# Patient Record
Sex: FEMALE
[De-identification: names, ages, dates, MRNs, and addresses within clinical notes are randomized per-mention and may not be internally consistent; named-entity substitution may affect disease eponyms.]

---

## 2018-11-21 ENCOUNTER — HOSPITAL ENCOUNTER (OUTPATIENT)
Dept: HOSPITAL 31 - C.SDS | Age: 38
Discharge: HOME | End: 2018-11-21
Attending: STUDENT IN AN ORGANIZED HEALTH CARE EDUCATION/TRAINING PROGRAM
Payer: COMMERCIAL

## 2018-11-21 VITALS
SYSTOLIC BLOOD PRESSURE: 105 MMHG | OXYGEN SATURATION: 99 % | HEART RATE: 83 BPM | DIASTOLIC BLOOD PRESSURE: 53 MMHG | TEMPERATURE: 97.7 F

## 2018-11-21 VITALS — RESPIRATION RATE: 18 BRPM

## 2018-11-21 VITALS — BODY MASS INDEX: 32.8 KG/M2

## 2018-11-21 DIAGNOSIS — M24.151: ICD-10-CM

## 2018-11-21 DIAGNOSIS — M67.861: ICD-10-CM

## 2018-11-21 DIAGNOSIS — M65.851: ICD-10-CM

## 2018-11-21 DIAGNOSIS — S73.191S: Primary | ICD-10-CM

## 2018-11-21 DIAGNOSIS — M71.58: ICD-10-CM

## 2018-11-21 DIAGNOSIS — M23.203: ICD-10-CM

## 2018-11-21 PROCEDURE — 20610 DRAIN/INJ JOINT/BURSA W/O US: CPT

## 2018-11-21 PROCEDURE — 77002 NEEDLE LOCALIZATION BY XRAY: CPT

## 2018-11-21 NOTE — RAD
Date of service: 



11/21/2018



PROCEDURE:  Intraoperative Fluoroscopy. 



HISTORY:

Right HIP LABRAL TEAR, right GREAT TROCHANTER BURSITIS



FINDINGS:

Fluoroscopic assistance was provided for right hip and right greater 

trochanteric injection.  Please refer to the operative report from 

LUECRO Rhodes, , MD DENIS.

## 2018-11-21 NOTE — PCM.SURG1
Surgeon's Initial Post Op Note





- Surgeon's Notes


Surgeon: Joelle Lomax MD


Assistant: None


Type of Anesthesia: IV Sedation, Local


Pre-Operative Diagnosis: A. Right hip:  #1 labral tear.  #2 synovitis.  #3 

Greater Trochanteric Bursitis.  #4 Abductor Tendonopathy.  .  B. Right knee:  #1

synovitis.  #2 hypertrophic symptomatic medial plica bands.  #3 hypertrophic, 

inflamed infrapatellar fat pad.  #4 medial meniscal tear (consistently on physi

maricruz exam, not seen on MRI)


Operative Findings: A. Right hip:  #1 labral tear.  #2 synovitis.  #3 Greater 

Trochanteric Bursitis.  #4 Abductor Tendonopathy.  .  B. Right knee:  #1 

synovitis.  #2 hypertrophic symptomatic medial plica bands.  #3 hypertrophic, 

inflamed infrapatellar fat pad.  #4 medial meniscal tear (consistently on 

physical exam, not seen on MRI)


Post-Operative Diagnosis: A. Right hip:  #1 labral tear.  #2 synovitis.  #3 

Greater Trochanteric Bursitis.  #4 Abductor Tendonopathy.  .  B. Right knee:  #1

synovitis.  #2 hypertrophic symptomatic medial plica bands.  #3 hypertrophic, 

inflamed infrapatellar fat pad.  #4 medial meniscal tear (consistently on 

physical exam, not seen on MRI)


Operation Performed: A.  Right hip :  #1 Flouroscoic guided intra-articular 

injection.  #2 Greater trochanteric bursitis injection.  .  B. Right knee:  #1 

Lateral compartment intra-articular injection.  #2 Medial compartment intra-

articular injection


Specimen/Specimens Removed: Specimen= none.  Complications= none.  Implants= 

none.  Injections= Each injection consisted of 5cc total volume= 2cc 2% 

Lidocaine w/o epi preservative free, 2cc 0.5% Marcaine w/o epi presevative free,

1cc Depomedrol preservatice free


Estimated Blood Loss: EBL {In ML}: 1


Blood Products Given: N/A


Drains Used: No Drains


Post-Op Condition: Good


Date of Surgery/Procedure: 11/21/18


Time of Surgery/Procedure: 09:00

## 2018-12-26 NOTE — OP
PROCEDURE DATE:  11/21/2018



PREOPERATIVE DIAGNOSES:

PART A:  Right hip:

1.  Labral tear.

2.  Synovitis.

3.  Greater trochanteric bursitis.

4.  Abductor tendinopathy.



PART B:  Right knee:

1.  Synovitis.

2.  Hypertrophic symptomatic medial plica bands.

3.  Hypertrophic inflamed infrapatellar fat pad.

4.  Medial meniscal tear.



POSTOPERATIVE DIAGNOSES:

PART A:  Right hip:

1.  Labral tear.

2.  Synovitis.

3.  Greater trochanteric bursitis.

4.  Abductor tendinopathy.



PAR B:  Right knee:

1.  Synovitis.

2.  Hypertrophic symptomatic medial plica bands.

3.  Hypertrophic inflamed infrapatellar fat pad.

4.  Medial meniscal tear.



PROCEDURE:

Part A:  Right hip

1.  Fluoroscopic guided intraarticular cortisone mixture injection.

2.  Fluoroscopic guided greater trochanteric bursa/abductor tendon,

cortisone mixture injection.



PART B:

1.  Right knee lateral compartment intraarticular cortisone mixture

injection under sedation.

2.  Medial compartment intraarticular injection under sedation.



SURGEON:  Joelle Lomax MD.



ASSISTANT:  None.



ANESTHESIA:  IV sedation and local anesthetic placed at right hip by

surgeon.



SPECIMENS:  None.



COMPLICATIONS:  None.



IMPLANTS:  None.



ESTIMATED BLOOD LOSS:  1 mL.



DRAINS:  None.



DISPOSITION:  The patient was awakened from sedation and transferred to

PACU in stable condition.



INJECTION CONTENTS:  Each injection administered, four in total, two for

right hip and two for right knee consisted of 5 mL total volume = 2 mL 2%

lidocaine without epinephrine, preservative free, 2 mL 0.5% Marcaine

without epinephrine preservative free, 1 mL Depo-Medrol 40 mg preservative

free.



INDICATIONS FOR PROCEDURE:  The patient is a 38-year-old female with a past

medical history significant for hypothyroidism and anxiety who presents to

the office for the first time under my care on 10/25/2018 with right hip

and right knee pain, progressively worsening over the past three months of

insidious onset.  The pain became significant negative impact on her

quality of life with difficulty with ambulation and going up and down

stairs.  MRI of the right hip done [facility] was read as:

1.  No evidence of fracture AVN.

2.  Tear of the anterior superior right acetabular labrum.

3.  Mild greater trochanteric bursitis.

4.  No signal abnormalities are seen within the sciatic nerve.



Right knee MRI done at [facility] on 11/06/2018

was read as:

1.  Mild soft tissue edema, small right knee joint effusion with a small

medial and infrapatellar plica.  No fracture or contusion pattern.

2.  Mild patellar malalignment.  No gross chondral defect.

3.  No meniscal or ligament tear.



On my review of the MRI, I felt that there was mild peripheral signal

change in the medial meniscus.



Physical examination of the right hip was consistent with a labral tear

with pain at high flexion and internal rotation and pain in the groin.  She

also has tenderness to palpation at the greater trochanteric bursa and pain

with abduction at the abductor insertion at the greater trochanter.



Right knee examination was consistent with global boggy synovitis and

medial plica bands as well as hypertrophic inflamed fat pad and consistent

medial Isabel and signs of medial meniscus tear clinically despite the

MRI read.  We began to discuss the benefits of maximizing conservative

treatment with multiple cortisone mixture injections.  The cortisone

mixture injections to the right hip would prove to be diagnostic as well as

potentially therapeutic.  If her pain is  significantly improved and does

not return, then she will not be indicated for arthroscopic hip surgery. 

If the injection does nothing to her groin pain, then again she will not be

indicated for arthroscopic hip surgery.  If the pain does improve and

eventually does return localized to the groin, then she will be indicated

for right hip arthroscopic labral repair and possible endoscopic greater

trochanteric bursectomy depending on how much conservative treatment is

performed between now and then to the greater trochanteric bursitis and

abductor tendinopathy as there is no complete abductor tear to warrant

immediate surgical intervention to the abductor at this point in time.



Right knee was indicated for cortisone mixture injections as well.  The

patient has significant anxiety with any needle procedures and therefore

requested being under sedation for the right hip injections under fluoro as

well as the right knee injections.  The risks, benefits and alternatives to

the procedures discussed in length with the patient with the risks

including not limited to infection, neurovascular damage, pain,

inflammatory reactions, allergic reactions.  After I answered all of her

questions, they understood the risks and wished to proceed with the

procedure.  She was referred to primary care physician for preadmission

testing and medical evaluation and the procedure was scheduled at [facility] on 11/21/2018.



PROCEDURE IN DETAIL:  The patient was identified in the preoperative

holding area and the right hip and the right knee were marked for the

procedure.  As described above, the risks, benefits, and alternatives to

both the procedures were discussed in length with the patient and informed

consent was obtained for both the right hip and the right knee procedures. 

After a brief discussion with anesthesia staff, the patient was taken to

the operating room, placed on a well padded operating room table with all

bony prominences and superficial neurovascular structures well padded on

the radiolucent table.  IV sedation was administered.  Final time-out was

done with the surgeon, anesthesia staff, OR staff, all in agreement with

the patient, procedure being done and the extremity being operated on.  We

began the procedure with the right hip treatment.  The right hip was

prepped and draped at the level of the intraarticular fluoroscopic guided

injection as well as the greater trochanteric injection.  Anatomic

landmarks were mapped out identifying the ASIS and tip of the greater

trochanter and then intersecting line was drawn between both with care

taken to stay in the upper outer quadrant to minimize the chance of a

neurovascular injury.  Optimal entry point for the spinal needle was

identified for the intraarticular injection.  Fluoroscopic imaging was used

to confirm the location.  A 10 mL of 2% lidocaine without epinephrine

preservative free were injected and infiltrated at the injection sites. 

Skin and soft tissue passed down to the superior head and neck junction of

the head.  Once local anesthetic took place, the spinal needle was advanced

through the skin down to the superior head and neck junction of the right

hip intraarticular space under fluoroscopic guidance.  A 2 mL of mixture of

2% lidocaine with epinephrine preservative free and radio-opaque contrast

were then administered into the intraarticular space confirming an

intraarticular position of the spinal needle tip.  A positive ring sign was

demonstrated on fluoroscopic imaging confirming an intraarticular position

of the needle tip.  Excess contrast lidocaine mixture was re-aspirated and

the 5 mL treatment injection was delivered in its entirety in the

intraarticular position as a cortisone mixture injection intraarticular

under fluoroscopic guidance to the right hip.  Spinal needle was then

removed after the injection was completed and the greater trochanteric

injection was then carried out.  With the help of fluoroscopic imaging, the

spinal needle was advanced through the lateral skin at the proximal thigh

down to the greater trochanter and the 5 mL injection was delivered in its

entirety at the greater trochanteric bursa in the abductor tendons.  Once

the right hip treatment was completed, sterile dressings were applied and

attention was then turned towards the right knee treatment under sedation.



RIGHT KNEE INJECTION UNDER SEDATION:

1.  Right knee was prepped and draped in standard sterile fashion.  Final

time-out was done with the surgeon, anesthesia staff, OR staff, all in

agreement with the patient, procedure being done, extremity to be operated

on.  A 5 mL cortisone mixture injection as described above was delivered in

its entirety through an anterior lateral portal approach at the soft spot

at the anterolateral aspect of the knee in an intraarticular position.  The

injection was delivered without any resistance in an intraarticular

position in its entirety.  An anterior medial injection was also carried

out to inject the medial compartment with a 5-mL cortisone mixture

injection as described above in its contents.  The injection was delivered

in its entirety without resistance to the medial compartment in the

intraarticular position.  Sterile dressings were applied as well as a

compressive Ace wrap to the right knee.  The patient was then awakened from

IV sedation and transferred to the PACU in stable condition.



I personally examined the patient preop and postop with confirmation of the

patient being neurovascularly intact to the right lower extremity, both

preop and postop.  She had dorsalis pedis and posterior tibialis pulses

preop and postop.  As far as her physical examination of the hip and knee

go, she denied having any pain to the right hip, groin or greater

trochanteric bursa or the right knee in recovery after the procedure

compared to prior to the procedure where she had significant groin pain and

greater trochanteric bursa pain and abductor pain as well as right knee

medial, lateral and anterior pain that completely resolved with the

injections.



DISPOSITION:  The patient will be discharged home once she is recovered

from IV sedation.  She was given a prescription for Vicoprofen for pain

control.  She will follow up with my office within two weeks and already

has an appointment at [facility].  She will contact me directly

if there are any questions or concerns.







__________________________________________

Joelle Lomax MD





DD:  12/26/2018 3:27:02

DT:  12/26/2018 7:53:10

Job # [id number]